# Patient Record
Sex: FEMALE | Race: BLACK OR AFRICAN AMERICAN | Employment: OTHER | RURAL
[De-identification: names, ages, dates, MRNs, and addresses within clinical notes are randomized per-mention and may not be internally consistent; named-entity substitution may affect disease eponyms.]

---

## 2017-01-10 RX ORDER — LOSARTAN POTASSIUM 50 MG/1
50 TABLET ORAL DAILY
Qty: 30 TAB | Refills: 5 | Status: SHIPPED | OUTPATIENT
Start: 2017-01-10 | End: 2018-04-23 | Stop reason: DRUGHIGH

## 2017-01-10 RX ORDER — FUROSEMIDE 20 MG/1
20 TABLET ORAL DAILY
Qty: 30 TAB | Refills: 5 | Status: SHIPPED | OUTPATIENT
Start: 2017-01-10

## 2017-02-13 ENCOUNTER — OFFICE VISIT (OUTPATIENT)
Dept: CARDIOLOGY CLINIC | Age: 67
End: 2017-02-13

## 2017-02-13 VITALS
WEIGHT: 257 LBS | DIASTOLIC BLOOD PRESSURE: 78 MMHG | HEART RATE: 89 BPM | SYSTOLIC BLOOD PRESSURE: 142 MMHG | OXYGEN SATURATION: 98 % | BODY MASS INDEX: 38.95 KG/M2 | HEIGHT: 68 IN | RESPIRATION RATE: 18 BRPM

## 2017-02-13 DIAGNOSIS — I50.32 DIASTOLIC CHF, CHRONIC (HCC): ICD-10-CM

## 2017-02-13 DIAGNOSIS — I10 ESSENTIAL HYPERTENSION: Primary | ICD-10-CM

## 2017-02-13 DIAGNOSIS — R60.0 LOCALIZED EDEMA: ICD-10-CM

## 2017-02-13 NOTE — PROGRESS NOTES
Rajesh Rodriguez is a 74 y.o. female is here for routine f/u. Had weaned off the amlodipine (LE edema), and losartan dose uptitrated to 100mg every day. Doing well, no CV sx or complaints. The patient denies chest pain/ shortness of breath, orthopnea, PND, palpitations, syncope, presyncope or fatigue. Patient Active Problem List    Diagnosis Date Noted    Essential hypertension 07/01/2016    Localized edema 01/50/6602    Diastolic CHF, chronic ([address] 07/01/2016    Hypertensive cardiovascular disease 07/01/2016 June Scott Macedo MD  Past Medical History   Diagnosis Date    Diastolic CHF ([facility] [address]     DJD (degenerative joint disease)     Edema     Gout     HTN (hypertension)     Hypertensive cardiovascular disease     Venous insufficiency of both lower extremities       Past Surgical History   Procedure Laterality Date    Hx lumbar laminectomy       No Known Allergies   Family History   Problem Relation Age of Onset    Hypertension Mother       Social History     Social History    Marital status: SINGLE     Spouse name: N/A    Number of children: N/A    Years of education: N/A     Occupational History    Not on file. Social History Main Topics    Smoking status: Former Smoker     Types: Cigarettes     Quit date: 7/1/1996    Smokeless tobacco: Not on file    Alcohol use Not on file    Drug use: Not on file    Sexual activity: Not on file     Other Topics Concern    Not on file     Social History Narrative      Current Outpatient Prescriptions   Medication Sig    losartan (COZAAR) 50 mg tablet Take 1 Tab by mouth daily. (Patient taking differently: Take 100 mg by mouth daily.)    furosemide (LASIX) 20 mg tablet Take 1 Tab by mouth daily. No current facility-administered medications for this visit. Review of Symptoms:    CONST  No weight change. No fever, chills, sweats    ENT No visual changes, URI sx, sore throat    CV  See HPI   RESP  No cough, or sputum, wheezing. Also see HPI   GI  No abdominal pain or change in bowel habits. No heartburn or dysphagia. No melena or rectal bleeding.   No dysuria, urgency, frequency, hematuria   MSKEL  No joint pain, swelling. No muscle pain. SKIN  No rash or lesions. NEURO  No headache, syncope, or seizure. No weakness, loss of sensation, or paresthesias. PSYCH  No low mood or depression  No anxiety. HE/LYMPH  No easy bruising, abnormal bleeding, or enlarged glands. Physical ExamPhysical Exam:    Visit Vitals    Pulse 89    Resp 18    Ht 5' 8\" (1.727 m)    Wt 257 lb (116.6 kg)    SpO2 98%    BMI 39.08 kg/m2     Gen: NAD  HEENT:  PERRL, throat clear  Neck: no mass or thyromegaly, no JVD   Heart:  Regular,Nl S1S2,  no murmur, gallop or rub.   Lungs:  clear  Abdomen:   Soft, non-tender, bowel sounds are active.   Extremities: Mild edema  Pulse: symmetric  Neuro: A&O times 3, WNL      Cardiographics    CARDIAC TESTING:    ECHO 4/12/16--mild LVH, LVEF 60-65, D1, trace MR, RVSP <35. LEXISCAN MPI 8/3/16--mod sized partially reversible apical defect likely due to attenuation artifact, doubt ischmemia, LVEF 46%      Labs:   No results found for: NA, K, CL, CO2, AGAP, GLU, BUN, CREA, BUCR, GFRAA, GFRNA, CA, TBIL, TBILI, GPT, SGOT, AP, TP, ALB, GLOB, AGRAT, ALT  No results found for: CPK, CPKX, CPX  No results found for: CHOL, CHOLX, CHLST, CHOLV, 564323, HDL, LDL, DLDL, LDLC, DLDLP, TGL, TGLX, TRIGL, CVC417684, TRIGP, CHHD, CHHDX  No results found for this or any previous visit. Assessment:         Patient Active Problem List    Diagnosis Date Noted    Essential hypertension 07/01/2016    Localized edema 62/85/0983    Diastolic CHF, chronic ([facility] [address] 07/01/2016    Hypertensive cardiovascular disease 07/01/2016       Had weaned off the amlodipine (LE edema), and losartan dose uptitrated to 100mg every day. Doing well, no CV sx or complaints. Plan:     Doing well with no adverse cardiac symptoms.   Home BP monitoring. F/u with PCP--due for labs, etc.  Diet, exercise, elevate legs. Lipids and labs followed by PCP. Continue current care and f/u in 12 months.     Casimiro Proctor MD

## 2017-02-13 NOTE — PROGRESS NOTES
Verified patient with two patient identifiers. Medications reviewed/approved by Dr. Glendy Carlton. Verbal from Dr. Glendy Carlton to remove the medications that were deleted during the visit.

## 2017-02-13 NOTE — MR AVS SNAPSHOT
Visit Information Date & Time Provider Department Dept. Phone Encounter #  
 2/13/2017  3:00 PM Teri Welch, [address] [facility] [address] [place] [address] [id number] Follow-up Instructions Return in about 6 months (around 8/13/2017). Follow-up and Disposition History Your Appointments 2/14/2018 11:00 AM  
ESTABLISHED PATIENT with Teri Welch MD  
[address] [facility] - [facility] [address] [place] [address]) [address] [place] [phone]  
  
   
 [address] [place] [address] Upcoming Health Maintenance Date Due Hepatitis C Screening 1950 DTaP/Tdap/Td series (1 - Tdap) 4/30/1969 BREAST CANCER SCRN MAMMOGRAM 4/30/1998 FOBT Q 1 YEAR AGE 50-75 4/30/1998 ZOSTER VACCINE AGE 60> 4/30/2008 GLAUCOMA SCREENING Q2Y 4/30/2013 OSTEOPOROSIS SCREENING (DEXA) 4/30/2013 Pneumococcal 65+ Low/Medium Risk (1 of 2 - PCV13) 4/30/2013 MEDICARE YEARLY EXAM 4/30/2013 INFLUENZA AGE 9 TO ADULT 8/1/2016 Allergies as of 2/13/2017  Review Complete On: 2/13/2017 By: Teri Welch MD  
 No Known Allergies Current Immunizations  Never Reviewed No immunizations on file. Not reviewed this visit You Were Diagnosed With   
  
 Codes Comments Essential hypertension    -  Primary ICD-10-CM: I10 
ICD-9-CM: 401.9 Diastolic CHF, chronic (HCC)     ICD-10-CM: I50.32 
ICD-9-CM: 428.32, 428.0 Localized edema     ICD-10-CM: R60.0 ICD-9-CM: 565. 3 Vitals BP Pulse Resp Height(growth percentile) Weight(growth percentile) SpO2  
 142/78 (BP 1 Location: Left arm, BP Patient Position: Sitting) 89 18 5' 8\" (1.727 m) 257 lb (116.6 kg) 98% BMI Smoking Status 39.08 kg/m2 Former Smoker Vitals History BMI and BSA Data Body Mass Index Body Surface Area 39.08 kg/m 2 2.37 m 2 Preferred Pharmacy Pharmacy Name Phone [address], [address] [facility] AT [facility] OF [address] [phone] Your Updated Medication List  
  
   
This list is accurate as of: 2/13/17  3:31 PM.  Always use your most recent med list.  
  
  
  
  
 furosemide 20 mg tablet Commonly known as:  LASIX Take 1 Tab by mouth daily. losartan 50 mg tablet Commonly known as:  COZAAR Take 1 Tab by mouth daily. Follow-up Instructions Return in about 6 months (around 8/13/2017). Introducing \[facility] [place] [facility]\"" & HEALTH SERVICES! [employer] introduces "[employer] patient portal. Now you can access parts of your medical record, email your doctor's office, and request medication refills online. 1. In your internet browser, go to [email or web address] 2. Click on the First Time User? Click Here link in the Sign In box. You will see the New Member Sign Up page. 3. Enter your "[employer] Access Code exactly as it appears below. You will not need to use this code after youve completed the sign-up process. If you do not sign up before the expiration date, you must request a new code. · "[employer] Access Code: [email or web address] Expires: 5/14/2017  3:30 PM 
 
4. Enter the last four digits of your Social Security Number (xxxx) and Date of Birth (mm/dd/yyyy) as indicated and click Submit. You will be taken to the next sign-up page. 5. Create a [employer] ID. This will be your "[employer] login ID and cannot be changed, so think of one that is secure and easy to remember. 6. Create a "[employer] password. You can change your password at any time. 7. Enter your Password Reset Question and Answer. This can be used at a later time if you forget your password. 8. Enter your e-mail address. You will receive e-mail notification when new information is available in [address] 9. Click Sign Up. You can now view and download portions of your medical record. 10. Click the Download Summary menu link to download a portable copy of your medical information. If you have questions, please visit the Frequently Asked Questions section of the [employer] website. Remember, [employer] is NOT to be used for urgent needs. For medical emergencies, dial 911. Now available from your iPhone and Android! Please provide this summary of care documentation to your next provider. Your primary care clinician is listed as [facility]. If you have any questions after today's visit, please call [phone].

## 2018-04-23 ENCOUNTER — OFFICE VISIT (OUTPATIENT)
Dept: CARDIOLOGY CLINIC | Age: 68
End: 2018-04-23

## 2018-04-23 VITALS
RESPIRATION RATE: 14 BRPM | OXYGEN SATURATION: 97 % | DIASTOLIC BLOOD PRESSURE: 88 MMHG | BODY MASS INDEX: 38.34 KG/M2 | HEART RATE: 73 BPM | WEIGHT: 253 LBS | HEIGHT: 68 IN | SYSTOLIC BLOOD PRESSURE: 150 MMHG

## 2018-04-23 DIAGNOSIS — I10 ESSENTIAL HYPERTENSION: Primary | ICD-10-CM

## 2018-04-23 DIAGNOSIS — I50.32 DIASTOLIC CHF, CHRONIC (HCC): ICD-10-CM

## 2018-04-23 DIAGNOSIS — I87.2 VENOUS INSUFFICIENCY OF BOTH LOWER EXTREMITIES: ICD-10-CM

## 2018-04-23 DIAGNOSIS — R60.0 LOCALIZED EDEMA: ICD-10-CM

## 2018-04-23 RX ORDER — LOSARTAN POTASSIUM 100 MG/1
100 TABLET ORAL DAILY
COMMUNITY

## 2018-04-23 RX ORDER — CHOLECALCIFEROL (VITAMIN D3) 125 MCG
CAPSULE ORAL DAILY
COMMUNITY

## 2018-04-23 NOTE — MR AVS SNAPSHOT
[address] [facility] [address] 
 
 
 [address] [facility] [address] [address] [phone] Patient: Christiana Modi MRN: [id number] HALLEY:[id number] Visit Information Date & Time Provider Department Dept. Phone Encounter #  
 4/23/2018  9:40 AM Kalli Espinoza MD [facility] [phone] [id number] Follow-up Instructions Return in about 1 year (around 4/23/2019). Follow-up and Disposition History Your Appointments 4/23/2019 10:00 AM  
ESTABLISHED PATIENT with Kalli Espinoza MD  
[address] [facility] [place] - [facility]) Appt Note: annual f/u $0cp [address] [phone]  
  
   
 [address] [place], [place] [id number] Upcoming Health Maintenance Date Due Hepatitis C Screening 1950 DTaP/Tdap/Td series (1 - Tdap) 4/30/1969 FOBT Q 1 YEAR AGE 50-75 4/30/1998 ZOSTER VACCINE AGE 60> 2/29/2008 GLAUCOMA SCREENING Q2Y 4/30/2013 Bone Densitometry (Dexa) Screening 4/30/2013 Pneumococcal 65+ Low/Medium Risk (1 of 2 - PCV13) 4/30/2013 Influenza Age 5 to Adult 8/1/2017 MEDICARE YEARLY EXAM 3/20/2018 BREAST CANCER SCRN MAMMOGRAM 7/12/2019 Allergies as of 4/23/2018  Review Complete On: 4/23/2018 By: Kalli Espinoza MD  
 No Known Allergies Current Immunizations  Never Reviewed No immunizations on file. Not reviewed this visit You Were Diagnosed With   
  
 Codes Comments Essential hypertension    -  Primary ICD-10-CM: I10 
ICD-9-CM: 401.9 Diastolic CHF, chronic (HCC)     ICD-10-CM: I50.32 
ICD-9-CM: 428.32, 428.0 Localized edema     ICD-10-CM: R60.0 ICD-9-CM: 457. 3 Venous insufficiency of both lower extremities     ICD-10-CM: I87.2 ICD-9-CM: 459.81 Vitals BP Pulse Resp Height(growth percentile) Weight(growth percentile) SpO2 150/88 (BP 1 Location: Right arm, BP Patient Position: Sitting) 73 14 5' 8\" (1.727 m) 253 lb (114.8 kg) 97% BMI OB Status Smoking Status 38.47 kg/m2 Menopause Former Smoker Vitals History BMI and BSA Data Body Mass Index Body Surface Area  
 38.47 kg/m 2 2.35 m 2 Preferred Pharmacy Pharmacy Name Phone [address], [address] AT [facility] OF  [address] & [address] [facility]. [facility] [phone] Your Updated Medication List  
  
   
This list is accurate as of 4/23/18 10:14 AM.  Always use your most recent med list.  
  
  
  
  
 ALEVE 220 mg Cap Generic drug:  naproxen sodium Take  by mouth daily. furosemide 20 mg tablet Commonly known as:  LASIX Take 1 Tab by mouth daily. losartan 100 mg tablet Commonly known as:  COZAAR Take 100 mg by mouth daily. We Performed the Following AMB POC EKG ROUTINE W/ 12 LEADS, INTER & REP [31195 CPT(R)] Follow-up Instructions Return in about 1 year (around 4/23/2019). Introducing [facility] [place] & [facility] SERVICES! [employer] introduces [employer] patient portal. Now you can access parts of your medical record, email your doctor's office, and request medication refills online. 1. In your internet browser, go to [email or web address] 2. Click on the First Time User? Click Here link in the Sign In box. You will see the New Member Sign Up page. 3. Enter your [employer] Access Code exactly as it appears below. You will not need to use this code after youve completed the sign-up process. If you do not sign up before the expiration date, you must request a new code. · [employer] Access Code: [email or web address] Expires: 7/22/2018 10:13 AM 
 
4. Enter the last four digits of your Social Security Number (xxxx) and Date of Birth (mm/dd/yyyy) as indicated and click Submit. You will be taken to the next sign-up page. 5. Create a [employer] ID. This will be your [employer] login ID and cannot be changed, so think of one that is secure and easy to remember. 6. Create a [employer] password. You can change your password at any time. 7. Enter your Password Reset Question and Answer. This can be used at a later time if you forget your password. 8. Enter your e-mail address. You will receive e-mail notification when new information is available in [address] 9. Click Sign Up. You can now view and download portions of your medical record. 10. Click the Download Summary menu link to download a portable copy of your medical information. If you have questions, please visit the Frequently Asked Questions section of the [employer] website. Remember, [employer] is NOT to be used for urgent needs. For medical emergencies, dial 911. Now available from your iPhone and Android! Please provide this summary of care documentation to your next provider. Your primary care clinician is listed as [facility]. If you have any questions after today's visit, please call [phone].

## 2018-04-23 NOTE — PROGRESS NOTES
Rui Womack is a 69 y.o. female is here for routine f/u. No CV sx or complaints. The patient denies chest pain/ shortness of breath, orthopnea, PND, LE edema, palpitations, syncope, presyncope or fatigue. Patient Active Problem List    Diagnosis Date Noted    Essential hypertension 07/01/2016    Localized edema 55/07/4539    Diastolic CHF, chronic ([facility] 07/01/2016    Hypertensive cardiovascular disease 07/01/2016 June Avis Rangel MD  Past Medical History:   Diagnosis Date    Diastolic CHF ([address]     DJD (degenerative joint disease)     Edema     Gout     HTN (hypertension)     Hypertensive cardiovascular disease     Venous insufficiency of both lower extremities       Past Surgical History:   Procedure Laterality Date    HX LUMBAR LAMINECTOMY       No Known Allergies   Family History   Problem Relation Age of Onset    Hypertension Mother       Social History     Social History    Marital status: SINGLE     Spouse name: N/A    Number of children: N/A    Years of education: N/A     Occupational History    Not on file. Social History Main Topics    Smoking status: Former Smoker     Types: Cigarettes     Quit date: 1/1/1988    Smokeless tobacco: Never Used    Alcohol use Not on file    Drug use: Not on file    Sexual activity: Not on file     Other Topics Concern    Not on file     Social History Narrative      Current Outpatient Prescriptions   Medication Sig    losartan (COZAAR) 100 mg tablet Take 100 mg by mouth daily.  naproxen sodium (ALEVE) 220 mg cap Take  by mouth daily.  furosemide (LASIX) 20 mg tablet Take 1 Tab by mouth daily. No current facility-administered medications for this visit. Review of Symptoms:    CONST  No weight change. No fever, chills, sweats    ENT No visual changes, URI sx, sore throat    CV  See HPI   RESP  No cough, or sputum, wheezing. Also see HPI   GI  No abdominal pain or change in bowel habits.   No heartburn or dysphagia. No melena or rectal bleeding.   No dysuria, urgency, frequency, hematuria   MSKEL  No joint pain, swelling. No muscle pain. SKIN  No rash or lesions. NEURO  No headache, syncope, or seizure. No weakness, loss of sensation, or paresthesias. PSYCH  No low mood or depression  No anxiety. HE/LYMPH  No easy bruising, abnormal bleeding, or enlarged glands. Physical ExamPhysical Exam:    Visit Vitals    /88 (BP 1 Location: Right arm, BP Patient Position: Sitting)    Pulse 73    Resp 14    Ht 5' 8\" (1.727 m)    Wt 253 lb (114.8 kg)    SpO2 97%    BMI 38.47 kg/m2     Gen: NAD  HEENT:  PERRL, throat clear  Neck: no adenopathy, no thyromegaly, no JVD   Heart:  Regular,Nl S1S2,  no murmur, gallop or rub.   Lungs:  clear  Abdomen:   Soft, non-tender, bowel sounds are active.   Extremities:  No edema  Pulse: symmetric  Neuro: A&O times 3, No focal neuro deficits    EKG:  NSR, NST    CARDIAC TESTING:    ECHO 4/12/16--mild LVH, LVEF 60-65, D1, trace MR, RVSP <35. LEXISCAN MPI 8/3/16--mod sized partially reversible apical defect c/w possible ischemia, LVEF 46%    Assessment:         Patient Active Problem List    Diagnosis Date Noted    Essential hypertension 07/01/2016    Localized edema 29/34/4164    Diastolic CHF, chronic ([facility] 07/01/2016    Hypertensive cardiovascular disease 07/01/2016        Plan:     Doing well with no adverse cardiac symptoms. BP elevated today--Home BP monitoring. F/u with PCP--due for labs, etc.  Diet, exercise, elevate legs. Lipids and labs followed by PCP. Continue current care and f/u in 12 months.     Opal Keating MD

## 2018-04-23 NOTE — PROGRESS NOTES
PATIENT ID VERIFIED WITH TWO PATIENT IDENTIFIERS. PATIENT MEDICATIONS REVIEWED AND APPROVED BY DR. Stevie Delong. MEDICATIONS THAT WERE REMOVED FROM THIS VISIT HAVE BEEN APPROVED BY DR. Stevie Delong. Chief Complaint   Patient presents with    CHF     Annual follow up    Hypertension       1. Have you been to the ER, urgent care clinic since your last visit? Hospitalized since your last visit? No      2. Have you seen or consulted any other health care providers outside of the [facility] [place] [facility] since your last visit?  Yes Dr. Cristobal Hodge PCP for routine care and Eye surgeon Dr. Rigoberto Armas with [facility] for cataract surgery

## 2022-02-03 ENCOUNTER — HOSPITAL ENCOUNTER (OUTPATIENT)
Dept: MAMMOGRAPHY | Age: 72
Discharge: HOME | End: 2022-02-03
Attending: NURSE PRACTITIONER
Payer: MEDICARE

## 2022-02-03 VITALS — BODY MASS INDEX: 26.76 KG/M2 | WEIGHT: 176 LBS

## 2022-02-03 DIAGNOSIS — Z12.31 ENCOUNTER FOR SCREENING MAMMOGRAM FOR MALIGNANT NEOPLASM OF BREAST: ICD-10-CM

## 2022-02-03 PROCEDURE — 77063 BREAST TOMOSYNTHESIS BI: CPT
